# Patient Record
Sex: FEMALE | URBAN - METROPOLITAN AREA
[De-identification: names, ages, dates, MRNs, and addresses within clinical notes are randomized per-mention and may not be internally consistent; named-entity substitution may affect disease eponyms.]

---

## 2018-04-19 ENCOUNTER — APPOINTMENT (RX ONLY)
Dept: URBAN - METROPOLITAN AREA CLINIC 23 | Facility: CLINIC | Age: 63
Setting detail: DERMATOLOGY
End: 2018-04-19

## 2018-04-19 DIAGNOSIS — L43.8 OTHER LICHEN PLANUS: ICD-10-CM

## 2018-04-19 PROBLEM — L44.8 OTHER SPECIFIED PAPULOSQUAMOUS DISORDERS: Status: ACTIVE | Noted: 2018-04-19

## 2018-04-19 PROCEDURE — ? INTRALESIONAL KENALOG

## 2018-04-19 PROCEDURE — 11900 INJECT SKIN LESIONS </W 7: CPT

## 2018-04-19 ASSESSMENT — LOCATION SIMPLE DESCRIPTION DERM: LOCATION SIMPLE: CHEST

## 2018-04-19 ASSESSMENT — LOCATION ZONE DERM: LOCATION ZONE: TRUNK

## 2018-04-19 ASSESSMENT — LOCATION DETAILED DESCRIPTION DERM: LOCATION DETAILED: LEFT MEDIAL SUPERIOR CHEST

## 2018-04-19 NOTE — PROCEDURE: INTRALESIONAL KENALOG
Medical Necessity Clause: This procedure was medically necessary because the lesions that were treated were:
Include Z78.9 (Other Specified Conditions Influencing Health Status) As An Associated Diagnosis?: No
Expiration Date (Optional): 10/19
Lot # (Optional): [id number]
Total Volume Injected (Ccs- Only Use Numbers And Decimals): 0.2
Detail Level: Zone
X Size Of Lesion In Cm (Optional): 0
Consent: The risks of atrophy were reviewed with the patient.
Ndc# For Kenalog Only: 7966-1306-90
Administered By (Optional): Dr Dejuan Michel
Concentration Of Solution Injected (Mg/Ml): 2.0
Treatment Number (Optional): 1
Kenalog Preparation: Kenalog

## 2018-05-11 ENCOUNTER — RX ONLY (OUTPATIENT)
Age: 63
Setting detail: RX ONLY
End: 2018-05-11

## 2018-05-11 RX ORDER — TRETINOIN 0.5 MG/G
CREAM TOPICAL QD
Qty: 1 | Refills: 3 | Status: ERX | COMMUNITY
Start: 2018-05-11

## 2018-09-04 ENCOUNTER — APPOINTMENT (RX ONLY)
Dept: URBAN - METROPOLITAN AREA CLINIC 23 | Facility: CLINIC | Age: 63
Setting detail: DERMATOLOGY
End: 2018-09-04

## 2018-09-04 DIAGNOSIS — L60.8 OTHER NAIL DISORDERS: ICD-10-CM

## 2018-09-04 PROCEDURE — 99212 OFFICE O/P EST SF 10 MIN: CPT

## 2018-09-04 PROCEDURE — ? ADDITIONAL NOTES

## 2018-09-04 PROCEDURE — ? COUNSELING

## 2018-09-04 PROCEDURE — ? RECOMMENDATIONS

## 2018-09-04 ASSESSMENT — LOCATION DETAILED DESCRIPTION DERM
LOCATION DETAILED: LEFT MIDDLE FINGERNAIL
LOCATION DETAILED: RIGHT MIDDLE FINGERNAIL
LOCATION DETAILED: LEFT SMALL FINGERNAIL
LOCATION DETAILED: LEFT INDEX FINGERNAIL
LOCATION DETAILED: RIGHT INDEX FINGERNAIL
LOCATION DETAILED: RIGHT RING FINGERNAIL
LOCATION DETAILED: LEFT RING FINGERNAIL

## 2018-09-04 ASSESSMENT — LOCATION SIMPLE DESCRIPTION DERM
LOCATION SIMPLE: RIGHT MIDDLE FINGERNAIL
LOCATION SIMPLE: LEFT SMALL FINGERNAIL
LOCATION SIMPLE: RIGHT RING FINGERNAIL
LOCATION SIMPLE: LEFT RING FINGERNAIL
LOCATION SIMPLE: LEFT MIDDLE FINGERNAIL
LOCATION SIMPLE: RIGHT INDEX FINGERNAIL
LOCATION SIMPLE: LEFT INDEX FINGERNAIL

## 2018-09-04 ASSESSMENT — LOCATION ZONE DERM: LOCATION ZONE: FINGERNAIL

## 2018-09-04 NOTE — HPI: OTHER
Condition:: Brittle nails
Please Describe Your Condition:: Pt stated condition started a few weeks ago.  Has tried OTC products with no improvement

## 2018-12-13 ENCOUNTER — APPOINTMENT (RX ONLY)
Dept: URBAN - METROPOLITAN AREA CLINIC 23 | Facility: CLINIC | Age: 63
Setting detail: DERMATOLOGY
End: 2018-12-13

## 2018-12-13 DIAGNOSIS — L60.9 NAIL DISORDER, UNSPECIFIED: ICD-10-CM

## 2018-12-13 PROCEDURE — ? COUNSELING

## 2018-12-13 PROCEDURE — ? NAIL CLIPPING FOR PAS

## 2018-12-13 PROCEDURE — 99213 OFFICE O/P EST LOW 20 MIN: CPT

## 2018-12-13 ASSESSMENT — LOCATION SIMPLE DESCRIPTION DERM: LOCATION SIMPLE: RIGHT INDEX FINGERNAIL

## 2018-12-13 ASSESSMENT — LOCATION ZONE DERM: LOCATION ZONE: FINGERNAIL

## 2018-12-13 ASSESSMENT — LOCATION DETAILED DESCRIPTION DERM: LOCATION DETAILED: RIGHT INDEX FINGERNAIL

## 2018-12-13 NOTE — PROCEDURE: COUNSELING
Detail Level: Detailed
Patient Specific Counseling (Will Not Stick From Patient To Patient): Stop applying topical solution recommended \" Brian Phil". Pictures of the nails taken today.

## 2018-12-13 NOTE — HPI: NAIL DYSTROPHY
How Severe Is It?: moderate
Is This A New Presentation, Or A Follow-Up?: Follow Up Nail Dystrophy
Additional History: Pt stop using the chimica due to burning sensation.

## 2018-12-26 ENCOUNTER — RX ONLY (OUTPATIENT)
Age: 63
Setting detail: RX ONLY
End: 2018-12-26

## 2018-12-26 RX ORDER — CLOBETASOL PROPIONATE 0.5 MG/G
CREAM TOPICAL
Qty: 1 | Refills: 0 | Status: ERX | COMMUNITY
Start: 2018-12-26

## 2019-03-25 ENCOUNTER — RX ONLY (OUTPATIENT)
Age: 64
Setting detail: RX ONLY
End: 2019-03-25

## 2019-03-25 RX ORDER — VALACYCLOVIR HYDROCHLORIDE 500 MG/1
TABLET, FILM COATED ORAL BID
Qty: 6 | Refills: 3 | Status: ERX | COMMUNITY
Start: 2019-03-25

## 2020-05-05 ENCOUNTER — RX ONLY (OUTPATIENT)
Age: 65
Setting detail: RX ONLY
End: 2020-05-05

## 2020-05-05 RX ORDER — TRETINOIN 0.5 MG/G
CREAM TOPICAL QD
Qty: 1 | Refills: 3 | Status: ERX | COMMUNITY
Start: 2020-05-05

## 2020-07-10 ENCOUNTER — RX ONLY (OUTPATIENT)
Age: 65
Setting detail: RX ONLY
End: 2020-07-10

## 2020-07-10 RX ORDER — VALACYCLOVIR HYDROCHLORIDE 500 MG/1
TABLET, FILM COATED ORAL BID
Qty: 30 | Refills: 3 | Status: ERX

## 2020-08-10 ENCOUNTER — APPOINTMENT (RX ONLY)
Dept: URBAN - METROPOLITAN AREA CLINIC 23 | Facility: CLINIC | Age: 65
Setting detail: DERMATOLOGY
End: 2020-08-10

## 2020-08-10 VITALS — TEMPERATURE: 97.5 F

## 2020-08-10 DIAGNOSIS — Z41.9 ENCOUNTER FOR PROCEDURE FOR PURPOSES OTHER THAN REMEDYING HEALTH STATE, UNSPECIFIED: ICD-10-CM

## 2020-08-10 PROCEDURE — ? FILLERS

## 2020-08-10 PROCEDURE — ? DYSPORT

## 2020-08-10 NOTE — PROCEDURE: DYSPORT
Forehead Units: 0
Additional Area 4 Location: lateral eyes
Show Additional Area 3: Yes
Show Right And Left Brow Units: No
Additional Area 4 Units: 48
Expiration Date (Month Year): 01/31/21
Additional Area 1 Location: high forehead
Additional Area 5 Location: lateral brows
Price (Use Numbers Only, No Special Characters Or $): 0.00
Additional Area 5 Units: 10
Additional Area 1 Units: [address] [facility]
Detail Level: Simple
Additional Area 2 Location: glabella
Topical Anesthesia?: 20% benzocaine, 8% lidocaine, 4% tetracaine
Consent: Written consent obtained. Risks include but not limited to lid/brow ptosis, bruising, swelling, diplopia, temporary effect, incomplete chemical denervation.
Additional Area 2 Units: [address] [address]ía
Lot #: [id number]
Additional Area 3 Location: bunny lines
Length Of Topical Anesthesia Application (Optional): 15 minutes
Dilution (U/ 0.1cc): 1.5

## 2020-08-10 NOTE — PROCEDURE: FILLERS
Additional Area 1 Volume In Cc: 0
Additional Area 4 Location: Perioral
Additional Area 2 Location: Nasalabial, Glabellar fine lines- Complimentary
Additional Anesthesia Volume In Cc: 6
Include Cannula Information In Note?: No
Additional Area 1 Location: lateral cheeks, lateral mouth, NLFâs, marionette lines
Include Cannula Size?: 25G
Additional Area 1 Volume In Cc: 1
Lot #: [id number]
Additional Area 2 Location: cheeks, jawline, oral commissure
Include Cannula Length?: 1.5 inch
Expiration Date (Month Year): 09/2021
Filler: Voluma
Detail Level: Zone
Additional Area 1 Location: lateral mouth, perioral fine lines, vermillion lips, marionette lines
Include Cannula Brand?: DermaSculpt
Price (Use Numbers Only, No Special Characters Or $): 0.00
Lot #: [id number]
Additional Area 4 Location: lateral jawline, lateral mouth, glabella lines,
Additional Area 2 Location: Lips, oral commissure, glabellar fine fines
Additional Area 5 Location: Cheeks, vermillion lips, marionette fine lines, glabellar fine lines, lateral mouth
Expiration Date (Month Year): 08/31/2019
Map Statment: See [address] for Complete Details
Additional Area 3 Location: Glabbellar fine lines, Nasalabial folds, Marionette lines, vermillion lip
Lot #: [id number]
Expiration Date (Month Year): 2021-08-20
Anesthesia Type: 1% lidocaine without epinephrine
Consent: Written consent obtained. Risks include but not limited to bruising, beading, irregular texture, ulceration, infection, allergic reaction, scar formation, incomplete augmentation, temporary nature, procedural pain.
Post-Care Instructions: Patient instructed to apply ice to reduce swelling.
Additional Area 1 Location: lateral mouth, fine lines perioral, marionette lines, lateral cheeks, vermillion lips

## 2021-05-26 ENCOUNTER — RX ONLY (OUTPATIENT)
Age: 66
Setting detail: RX ONLY
End: 2021-05-26

## 2021-05-26 RX ORDER — TRETINOIN 0.5 MG/G
CREAM TOPICAL QD
Qty: 1 | Refills: 3 | Status: ERX

## 2021-07-08 ENCOUNTER — RX ONLY (OUTPATIENT)
Age: 66
Setting detail: RX ONLY
End: 2021-07-08

## 2021-07-08 RX ORDER — VALACYCLOVIR HYDROCHLORIDE 500 MG/1
TABLET, FILM COATED ORAL BID
Qty: 30 | Refills: 3 | Status: ERX

## 2022-05-20 ENCOUNTER — APPOINTMENT (RX ONLY)
Dept: URBAN - METROPOLITAN AREA CLINIC 23 | Facility: CLINIC | Age: 67
Setting detail: DERMATOLOGY
End: 2022-05-20

## 2022-05-20 DIAGNOSIS — Z41.9 ENCOUNTER FOR PROCEDURE FOR PURPOSES OTHER THAN REMEDYING HEALTH STATE, UNSPECIFIED: ICD-10-CM

## 2022-05-20 PROCEDURE — ? ADDITIONAL NOTES

## 2022-05-20 PROCEDURE — ? PULSED-DYE LASER

## 2022-05-20 PROCEDURE — ? DYSPORT

## 2022-05-20 PROCEDURE — ? RESTYLANE REFYNE INJECTION

## 2022-05-20 ASSESSMENT — LOCATION SIMPLE DESCRIPTION DERM
LOCATION SIMPLE: LEFT CHEEK
LOCATION SIMPLE: LEFT FOREHEAD
LOCATION SIMPLE: RIGHT CHEEK
LOCATION SIMPLE: LEFT UPPER LIP
LOCATION SIMPLE: GLABELLA
LOCATION SIMPLE: RIGHT LIP
LOCATION SIMPLE: RIGHT FOREHEAD
LOCATION SIMPLE: RIGHT EYELID
LOCATION SIMPLE: CHIN

## 2022-05-20 ASSESSMENT — LOCATION ZONE DERM
LOCATION ZONE: LIP
LOCATION ZONE: FACE
LOCATION ZONE: EYELID

## 2022-05-20 ASSESSMENT — LOCATION DETAILED DESCRIPTION DERM
LOCATION DETAILED: RIGHT INFERIOR CENTRAL BUCCAL CHEEK
LOCATION DETAILED: GLABELLA
LOCATION DETAILED: RIGHT LATERAL FOREHEAD
LOCATION DETAILED: LEFT CHIN
LOCATION DETAILED: LEFT SUPERIOR LATERAL MALAR CHEEK
LOCATION DETAILED: RIGHT MEDIAL FOREHEAD
LOCATION DETAILED: RIGHT SUPERIOR MEDIAL BUCCAL CHEEK
LOCATION DETAILED: LEFT FOREHEAD
LOCATION DETAILED: LEFT INFERIOR CENTRAL BUCCAL CHEEK
LOCATION DETAILED: RIGHT SUPERIOR VERMILION LIP
LOCATION DETAILED: RIGHT LATERAL CANTHUS
LOCATION DETAILED: LEFT SUPERIOR VERMILION BORDER
LOCATION DETAILED: LEFT INFERIOR MEDIAL MALAR CHEEK

## 2022-05-20 NOTE — PROCEDURE: ADDITIONAL NOTES
Additional Notes: N/C V-beam for bruising
Render Risk Assessment In Note?: no
Detail Level: Detailed

## 2022-05-20 NOTE — PROCEDURE: RESTYLANE REFYNE INJECTION
Filler: Restylane Refyne
Detail Level: Detailed
Mid Face Filler  Volume In Cc: 0
Anesthesia Volume In Cc: 0.5
Include Cannula Information In Note?: No
Post-Care Instructions: Patient instructed to apply ice to reduce swelling.
Marionette Lines Filler  Volume In Cc: 0.3
Map Statement: See [address] for Complete Details
Show Inventory Tab: Show
Number Of Syringes (Required For Inventory): 1
Procedural Text: The filler was administered to the treatment areas noted above.
Consent: Written consent obtained. Risks include but not limited to bruising, beading, irregular texture, ulceration, infection, allergic reaction, scar formation, incomplete augmentation, temporary nature, procedural pain.
Additional Anesthesia Volume In Cc: 6
Anesthesia Type: 1% lidocaine with epinephrine
Nasolabial Folds Filler Volume In Cc: 0.4

## 2022-05-20 NOTE — PROCEDURE: PULSED-DYE LASER
Pulse Duration: 10 ms
Fluence In J/Cm2 (Optional): 6.5
Treated Area: small area
Spot Size: 7 mm
Price (Use Numbers Only, No Special Characters Or $): 0.00
Cryogen Time (Ms): 765[address] Kristofer García
Laser Type: Vbeam 595nm
Delay Time (Ms): [address] [facility]
Spot Size: 10 mm
Detail Level: Detailed
Cryogen Time (Ms): 30
Delay Time (Ms): 20
Consent: Written consent obtained, risks reviewed including but not limited to crusting, scabbing, blistering, scarring, darker or lighter pigmentary change, incidental hair removal, bruising, and/or incomplete removal.
Location Override: bruising
Post-Procedure Care: Vaseline and ice applied. Post care reviewed with patient.
Fluence In J/Cm2 (Optional): 7.00
Pulse Duration: 6 ms
Post-Care Instructions: I reviewed with the patient in detail post-care instructions. Patient should stay away from the sun and wear sun protection until treated areas are fully healed.

## 2022-05-20 NOTE — PROCEDURE: DYSPORT
Show Right And Left Pupillary Line Units: No
[facility] Units: 0
Show Additional Area 1: Yes
Post-Care Instructions: Patient instructed to not lie down for 4 hours, limit physical activity for 24 hours and avoid manipulation of the treated areas.
Detail Level: Detailed
Glabellar Complex Units: [address] Kristofer García
Additional Area 6 Location: brows
Expiration Date (Month Year): 07/31/20
Additional Area 4 Location: crows feet
Additional Area 6 Units: 10
Show Inventory Tab: Show
Additional Area 4 Units: 48
Lot #: [id number]
Additional Area 5 Location: bunny lines
Forehead Units: [address] [facility]
Dilution (U/ 0.1cc): 1.5
Additional Area 3 Location: forehead
Consent: Written consent obtained. Risks include but not limited to lid/brow ptosis, bruising, swelling, diplopia, temporary effect, incomplete chemical denervation.
Additional Area 1 Location: Glabella

## 2022-08-10 ENCOUNTER — APPOINTMENT (RX ONLY)
Dept: URBAN - METROPOLITAN AREA CLINIC 23 | Facility: CLINIC | Age: 67
Setting detail: DERMATOLOGY
End: 2022-08-10

## 2022-08-10 DIAGNOSIS — Z41.9 ENCOUNTER FOR PROCEDURE FOR PURPOSES OTHER THAN REMEDYING HEALTH STATE, UNSPECIFIED: ICD-10-CM

## 2022-08-10 PROCEDURE — ? DYSPORT

## 2022-08-10 NOTE — PROCEDURE: DYSPORT
Right Pupillary Line Units: 0
Additional Area 4 Location: 2cm high forehead
Show Orbicularis Oculi Units: Yes
Expiration Date (Month Year): 2022-08
Show Inventory Tab: Show
Show Right And Left Pupillary Line Units: No
Additional Area 5 Location: glabella
Price (Use Numbers Only, No Special Characters Or $): 0.00
Additional Area 1 Location: neck bands
Additional Area 2 Location: crows feet
Lot #: [id number]
Consent: Written consent obtained. Risks include but not limited to lid/brow ptosis, bruising, swelling, diplopia, temporary effect, incomplete chemical denervation.
Additional Area 2 Units: 10
Additional Area 3 Location: lateral brows
Dilution (U/ 0.1cc): 1.5
Detail Level: Simple

## 2022-08-12 ENCOUNTER — RX ONLY (OUTPATIENT)
Age: 67
Setting detail: RX ONLY
End: 2022-08-12

## 2022-08-12 RX ORDER — TRETIONIN 0.5 MG/G
CREAM TOPICAL
Qty: 45 | Refills: 0 | Status: ERX | COMMUNITY
Start: 2022-08-12

## 2023-11-06 ENCOUNTER — APPOINTMENT (RX ONLY)
Dept: URBAN - METROPOLITAN AREA CLINIC 23 | Facility: CLINIC | Age: 68
Setting detail: DERMATOLOGY
End: 2023-11-06

## 2023-11-06 DIAGNOSIS — Z41.9 ENCOUNTER FOR PROCEDURE FOR PURPOSES OTHER THAN REMEDYING HEALTH STATE, UNSPECIFIED: ICD-10-CM

## 2023-11-06 PROCEDURE — ? DYSPORT

## 2023-11-06 PROCEDURE — ? INVENTORY

## 2023-11-06 PROCEDURE — ? FILLERS

## 2023-11-06 NOTE — PROCEDURE: DYSPORT
Show Additional Area 3: Yes
Periorbital Skin Units: [address]
Dilution (U/ 0.1cc): 1.5
Show Right And Left Periorbital Units: No
Additional Area 3 Units: 0
Additional Area 4 Location: 2cm high forehead
Expiration Date (Month Year): 2022-08
Additional Area 5 Location: glabella
Forehead Units: [address] [facility]
Additional Area 1 Location: neck bands
Price (Use Numbers Only, No Special Characters Or $): 0.00
Detail Level: Simple
Additional Area 2 Location: crows feet
Show Inventory Tab: Show
Consent: Written consent obtained. Risks include but not limited to lid/brow ptosis, bruising, swelling, diplopia, temporary effect, incomplete chemical denervation.
Lot #: [id number]

## 2024-03-11 ENCOUNTER — APPOINTMENT (RX ONLY)
Dept: URBAN - METROPOLITAN AREA CLINIC 23 | Facility: CLINIC | Age: 69
Setting detail: DERMATOLOGY
End: 2024-03-11

## 2024-03-11 DIAGNOSIS — Z41.9 ENCOUNTER FOR PROCEDURE FOR PURPOSES OTHER THAN REMEDYING HEALTH STATE, UNSPECIFIED: ICD-10-CM

## 2024-03-11 PROCEDURE — ? FILLERS

## 2024-03-11 PROCEDURE — ? DYSPORT

## 2024-03-11 PROCEDURE — ? INVENTORY

## 2024-03-11 NOTE — PROCEDURE: FILLERS
Include Cannula Information In Note?: No
Additional Area 5 Volume In Cc: 0
Additional Anesthesia Volume In Cc: 6
Number Of Syringes (Required For Inventory): 1
Inventory Information: This plan will send filler information to inventory based on the fillers you select. Multiple fillers can be sent but you must ensure you select the appropriate fillers in the inventory tab.
Additional Area 1 Location: lips, nasolabial, glabella
Detail Level: Detailed
Show Inventory Tab: Show
Filler: Restylane
Additional Area 1 Location: lateral mouth, perioral fine lines, marionette lines.
Consent: Written consent obtained. Risks include but not limited to bruising, beading, irregular texture, ulceration, infection, allergic reaction, scar formation, incomplete augmentation, temporary nature, procedural pain.
Map Statment: See Attach Map for Complete Details
Post-Care Instructions: Patient instructed to apply ice to reduce swelling.
Filler Comments: Restylane Eyelight
Anesthesia Type: 1% lidocaine with epinephrine
Anesthesia Volume In Cc: 0.5
Filler: Restylane-L

## 2024-03-11 NOTE — PROCEDURE: DYSPORT
Glabellar Complex Units: 30
Detail Level: Simple
Price (Use Numbers Only, No Special Characters Or $): 0.00
Show Anterior Platysmal Band Units: Yes
R Brow Units: 0
Additional Area 1 Location: neck bands
Show Right And Left Brow Units: No
Show Inventory Tab: Show
Additional Area 2 Location: crows feet
Lot #: [id number]
Consent: Written consent obtained. Risks include but not limited to lid/brow ptosis, bruising, swelling, diplopia, temporary effect, incomplete chemical denervation.
Additional Area 3 Location: glabella
Dilution (U/0.1 Cc): 1.5
Additional Area 4 Location: 2cm high forehead
Expiration Date (Month Year): 2022-08
Forehead Units: 20

## 2024-08-28 NOTE — PROCEDURE: FILLERS
Statement Selected
[place] Filler  Volume In Cc: 0
Include Cannula Information In Note?: No
Number Of Syringes (Required For Inventory): 1
Additional Anesthesia Volume In Cc: 6
Inventory Information: This plan will send filler information to inventory based on the fillers you select. Multiple fillers can be sent but you must ensure you select the appropriate fillers in the inventory tab.
Additional Area 1 Location: lateral mouth, marionette lines,
Detail Level: Detailed
Show Inventory Tab: Show
Consent: Written consent obtained. Risks include but not limited to bruising, beading, irregular texture, ulceration, infection, allergic reaction, scar formation, incomplete augmentation, temporary nature, procedural pain.
Filler: Restylane-L
Post-Care Instructions: Patient instructed to apply ice to reduce swelling.
Additional Area 1 Location: lateral mouth, perioral fine lines, marionette lines.
Map Statment: See [address] for Complete Details
[place] Lips Filler Volume In Cc: 0.5
Anesthesia Type: 1% lidocaine with epinephrine

## 2024-11-13 ENCOUNTER — APPOINTMENT (RX ONLY)
Dept: URBAN - METROPOLITAN AREA CLINIC 23 | Facility: CLINIC | Age: 69
Setting detail: DERMATOLOGY
End: 2024-11-13

## 2024-11-13 DIAGNOSIS — Z41.9 ENCOUNTER FOR PROCEDURE FOR PURPOSES OTHER THAN REMEDYING HEALTH STATE, UNSPECIFIED: ICD-10-CM

## 2024-11-13 PROCEDURE — ? IN-HOUSE DISPENSING PHARMACY

## 2024-11-13 NOTE — PROCEDURE: IN-HOUSE DISPENSING PHARMACY
Product 74 Amount/Unit (Numbers Only): 0
Name Of Product 2: Valtrex
Detail Level: Zone
Product 13 Application Directions: Apply to the entire face in the Am and pm
Product 11 Refills: 1
Product 50 Unit Type: mg
Product 22 Application Directions: Apply to the affected area of the face
Product 13 Unit Type: bottle(s)
Send Charges To Patient Encounter: No
Product 4 Application Directions: Take one tablet half hour today prior to procedure as indicated. Dispense in office
Product 2 Application Directions: apply to affected areas left lower leg am and pm  as indicated
Name Of Product 14: Skin Better Interfuse eye treatment cream
Product 4 Unit Type: tablets
Name Of Product 5: loratadine 10 mg
Name Of Product 8: TNS Essential
Name Of Product 3: Tretinoin 0.05% cream
Product 14 Application Directions: Apply to the under eyes in the Am and pm
Name Of Product 19: Diazepam 5mg
Name Of Product 1: Hydroquinone 4% / Tretinoin 0.05% / Fluocinolone 0.01%
Product 7 Unit Type: ml
Product 12 Application Directions: Apply a pea size amount to the entire face at bedtime.
Product 10 Application Directions: Take 1 tablet 2 times daily for 3 days
Product 21 Application Directions: Take one tablet 30 minutes prior to procedure
Product 8 Application Directions: Apply to the clean face in the AM and PM daily
Product 3 Amount/Unit (Numbers Only): 20
Product 1 Application Directions: Apply thin layer to right cheek every night  at bedtime only.
Name Of Product 13: Skin Better Even tone correcting serum
Product 1 Unit Type: tube(s)
Name Of Product 22: Brightening Pads
Name Of Product 9: Clearing tone pads
Product 2 Price/Unit (In Dollars): 0.00
Product 6 Application Directions: Take one tablet for swelling today at the office as indicated and one tablet tomorrow
Product 11 Application Directions: Apply a thin layer to the acne prone areas in the AM
Product 9 Application Directions: Apply to the acne prone areas in the Am and Pm
Name Of Product 7: Latisse 5ml
Product 2 Amount/Unit (Numbers Only): 6
Product 9 Unit Type: jar(s)
Name Of Product 12: Retinol Lite
Name Of Product 10: Valtrex 500 mg
Name Of Product 21: Valium 5mg
Product 7 Application Directions: Apply to lashes at bedtime daily as indicated
Product 3 Units Dispensed: 2
Product 7 Amount/Unit (Numbers Only): 5
Product 5 Application Directions: Take one tablet today after procedure with full glass of water as indicated
Product 3 Application Directions: Apply a pea size amount to the entire face at night
Product 3 Unit Type: grams
Name Of Product 6: Prednisone 20mg
Name Of Product 4: Valium 5 mg
Name Of Product 11: Clearing gel

## 2024-11-18 ENCOUNTER — APPOINTMENT (RX ONLY)
Dept: URBAN - METROPOLITAN AREA CLINIC 23 | Facility: CLINIC | Age: 69
Setting detail: DERMATOLOGY
End: 2024-11-18

## 2024-11-18 DIAGNOSIS — Z41.9 ENCOUNTER FOR PROCEDURE FOR PURPOSES OTHER THAN REMEDYING HEALTH STATE, UNSPECIFIED: ICD-10-CM

## 2024-11-18 PROCEDURE — ? DYSPORT

## 2024-11-18 PROCEDURE — ? IN-HOUSE DISPENSING PHARMACY

## 2024-11-18 PROCEDURE — ? FILLERS

## 2024-11-18 NOTE — PROCEDURE: FILLERS
Additional Area 2 Volume In Cc: 0
Include Cannula Information In Note?: No
Consent: Written consent obtained. Risks include but not limited to bruising, beading, irregular texture, ulceration, infection, allergic reaction, scar formation, incomplete augmentation, temporary nature, procedural pain.
Anesthesia Volume In Cc: 0.5
Number Of Syringes (Required For Inventory): 1
Post-Care Instructions: Patient instructed to apply ice to reduce swelling.
Additional Anesthesia Volume In Cc: 6
Map Statment: See Attach Map for Complete Details
Additional Area 2 Location: lateral jawline, lip lines, oral commissures
Filler: Restylane-L
Inventory Information: This plan will send filler information to inventory based on the fillers you select. Multiple fillers can be sent but you must ensure you select the appropriate fillers in the inventory tab.
Detail Level: Detailed
Additional Area 1 Location: lateral mouth, perioral fine lines, marionette lines.
Show Inventory Tab: Show
Anesthesia Type: 1% lidocaine with epinephrine

## 2024-11-18 NOTE — PROCEDURE: IN-HOUSE DISPENSING PHARMACY
Product 24 Price/Unit (In Dollars): 0
Name Of Product 1: Hydroquinone 4% / Tretinoin 0.05% / Fluocinolone 0.01%
Product 48 Unit Type: mg
Product 5 Application Directions: Take one tablet today after procedure with full glass of water as indicated
Product 10 Amount/Unit (Numbers Only): 1
Name Of Product 13: Skin Better Even tone correcting serum
Product 8 Application Directions: Apply to the clean face in the AM and PM daily
Name Of Product 22: Brightening Pads
Product 5 Unit Type: tablets
Product 3 Amount/Unit (Numbers Only): 20
Product 8 Unit Type: bottle(s)
Name Of Product 6: Prednisone 20mg
Product 1 Application Directions: Apply thin layer to right cheek every night  at bedtime only.
Product 13 Application Directions: Apply to the entire face in the Am and pm
Product 22 Application Directions: Apply to the affected area of the face
Name Of Product 9: Clearing tone pads
Product 1 Unit Type: tube(s)
Product 6 Application Directions: Take one tablet for swelling today at the office as indicated and one tablet tomorrow
Name Of Product 2: Valtrex
Product 2 Price/Unit (In Dollars): 0.00
Detail Level: Zone
Product 4 Application Directions: Take one tablet half hour today prior to procedure as indicated. Dispense in office
Name Of Product 12: Retinol Lite
Name Of Product 21: Valium 5mg
Send Charges To Patient Encounter: No
Product 2 Amount/Unit (Numbers Only): 6
Name Of Product 5: loratadine 10 mg
Product 21 Application Directions: Take one tablet 30 minutes prior to procedure
Product 12 Application Directions: Apply a pea size amount to the entire face at bedtime.
Product 7 Amount/Unit (Numbers Only): 5
Product 3 Units Dispensed: 4
Product 7 Unit Type: ml
Name Of Product 8: TNS Essential
Product 3 Application Directions: Apply a pea size amount to the entire face at night
Name Of Product 11: Clearing gel
Product 3 Unit Type: grams
Name Of Product 4: Valium 5 mg
Product 11 Application Directions: Apply a thin layer to the acne prone areas in the AM
Name Of Product 14: Skin Better Interfuse eye treatment cream
Product 9 Application Directions: Apply to the acne prone areas in the Am and Pm
Product 9 Unit Type: jar(s)
Name Of Product 7: Latisse 5ml
Product 2 Application Directions: apply to affected areas left lower leg am and pm  as indicated
Product 14 Application Directions: Apply to the under eyes in the Am and pm
Name Of Product 10: Valtrex 500 mg
Name Of Product 19: Diazepam 5mg
Product 7 Application Directions: Apply to lashes at bedtime daily as indicated
Name Of Product 3: Tretinoin 0.05% cream
Product 10 Application Directions: Take 1 tablet 2 times daily for 3 days

## 2024-11-18 NOTE — PROCEDURE: DYSPORT
Glabellar Complex Units: 30
Detail Level: Simple
Price (Use Numbers Only, No Special Characters Or $): 0.00
Show Anterior Platysmal Band Units: Yes
R Brow Units: 0
Additional Area 1 Location: neck bands
Show Right And Left Brow Units: No
Show Inventory Tab: Show
Additional Area 2 Location: crows feet
Lot #: [id number]
Consent: Written consent obtained. Risks include but not limited to lid/brow ptosis, bruising, swelling, diplopia, temporary effect, incomplete chemical denervation.
Additional Area 3 Location: glabella
Dilution (U/0.1 Cc): 1.5
Additional Area 4 Location: 2cm high forehead
Expiration Date (Month Year): 2022-08
Forehead Units: 20

## 2025-02-17 ENCOUNTER — APPOINTMENT (OUTPATIENT)
Dept: URBAN - METROPOLITAN AREA CLINIC 23 | Facility: CLINIC | Age: 70
Setting detail: DERMATOLOGY
End: 2025-02-17

## 2025-02-17 DIAGNOSIS — Z41.9 ENCOUNTER FOR PROCEDURE FOR PURPOSES OTHER THAN REMEDYING HEALTH STATE, UNSPECIFIED: ICD-10-CM

## 2025-02-17 PROCEDURE — ? DYSPORT

## 2025-02-17 PROCEDURE — ? FILLERS

## 2025-02-17 NOTE — PROCEDURE: FILLERS
Additional Area 5 Volume In Cc: 0
Anesthesia Type: 1% lidocaine with epinephrine
Additional Area 1 Location: lateral mouth, perioral fine lines, marionette lines.
Number Of Syringes (Required For Inventory): 1
Include Cannula Information In Note?: No
Anesthesia Volume In Cc: 0.5
Additional Anesthesia Volume In Cc: 6
Inventory Information: This plan will send filler information to inventory based on the fillers you select. Multiple fillers can be sent but you must ensure you select the appropriate fillers in the inventory tab.
Additional Area 1 Location: jawline,oral commesure
Detail Level: Detailed
Show Inventory Tab: Show
Filler: Restylane-L
Consent: Written consent obtained. Risks include but not limited to bruising, beading, irregular texture, ulceration, infection, allergic reaction, scar formation, incomplete augmentation, temporary nature, procedural pain.
Post-Care Instructions: Patient instructed to apply ice to reduce swelling.
Map Statment: See Attach Map for Complete Details

## 2025-02-17 NOTE — PROCEDURE: DYSPORT
Expiration Date (Month Year): 2022-08
Show Additional Area 3: Yes
Consent: Written consent obtained. Risks include but not limited to lid/brow ptosis, bruising, swelling, diplopia, temporary effect, incomplete chemical denervation.
Additional Area 4 Units: 0
Additional Area 5 Location: glabella
Show Ucl Units: No
Forehead Units: 20
Additional Area 1 Location: lat brows
Detail Level: Simple
Glabellar Complex Units: 50
Additional Area 1 Units: 10
Additional Area 2 Location: crows feet
Show Inventory Tab: Show
Lot #: [id number]
Price (Use Numbers Only, No Special Characters Or $): 0.00
Periorbital Skin Units: 40
Dilution (U/0.1 Cc): 1.5
Additional Area 4 Location: 2cm high forehead

## 2025-06-02 ENCOUNTER — APPOINTMENT (OUTPATIENT)
Dept: URBAN - METROPOLITAN AREA CLINIC 23 | Facility: CLINIC | Age: 70
Setting detail: DERMATOLOGY
End: 2025-06-02

## 2025-06-02 DIAGNOSIS — Z41.9 ENCOUNTER FOR PROCEDURE FOR PURPOSES OTHER THAN REMEDYING HEALTH STATE, UNSPECIFIED: ICD-10-CM

## 2025-06-02 PROCEDURE — ? IN-HOUSE DISPENSING PHARMACY

## 2025-06-02 PROCEDURE — ? DYSPORT

## 2025-06-02 PROCEDURE — ? FILLERS

## 2025-06-02 NOTE — PROCEDURE: DYSPORT
Show Additional Area 3: Yes
[facility] Units: 0
Show Ucl Units: No
Lot #: [id number]
Consent: Written consent obtained. Risks include but not limited to lid/brow ptosis, bruising, swelling, diplopia, temporary effect, incomplete chemical denervation.
Forehead Units: 20
Additional Area 1 Location: lat brows
Glabellar Complex Units: 50
Detail Level: Simple
Dilution (U/0.1 Cc): 1.5
Additional Area 1 Units: 10
Additional Area 2 Location: crows feet
Show Inventory Tab: Show
Expiration Date (Month Year): 2022-08
Additional Area 5 Location: glabella
Periorbital Skin Units: 40
Price (Use Numbers Only, No Special Characters Or $): 0.00
Additional Area 4 Location: 2cm high forehead

## 2025-06-02 NOTE — PROCEDURE: FILLERS
Cheeks Filler Volume In Cc: 0
Use Map Statement For Sites (Optional): No
Map Statment: See Attach Map for Complete Details
Number Of Syringes (Required For Inventory): 1
Consent: Written consent obtained. Risks include but not limited to bruising, beading, irregular texture, ulceration, infection, allergic reaction, scar formation, incomplete augmentation, temporary nature, procedural pain.
Post-Care Instructions: Patient instructed to apply ice to reduce swelling.
Anesthesia Type: 1% lidocaine with epinephrine
Anesthesia Volume In Cc: 0.5
Additional Anesthesia Volume In Cc: 6
Inventory Information: This plan will send filler information to inventory based on the fillers you select. Multiple fillers can be sent but you must ensure you select the appropriate fillers in the inventory tab.
Detail Level: Detailed
Additional Area 1 Location: jawline, oral commissures
Show Inventory Tab: Show
Filler: Restylane-L
Additional Area 1 Location: lateral mouth, perioral fine lines, marionette lines.

## 2025-06-02 NOTE — PROCEDURE: IN-HOUSE DISPENSING PHARMACY
Product 68 Amount/Unit (Numbers Only): 0
Product 1 Application Directions: Apply thin layer to right cheek every night  at bedtime only.
Product 12 Unit Type: bottle(s)
Product 10 Amount/Unit (Numbers Only): 1
Product 31 Unit Type: mg
Product 21 Application Directions: Take one tablet 30 minutes prior to procedure
Name Of Product 6: Prednisone 20mg
Product 1 Unit Type: tube(s)
Name Of Product 9: Clearing tone pads
Name Of Product 13: Skin Better Even tone correcting serum
Product 21 Unit Type: tablets
Name Of Product 2: Valtrex
Name Of Product 22: Brightening Pads
Product 6 Application Directions: Take one tablet for swelling today at the office as indicated and one tablet tomorrow
Product 13 Application Directions: Apply to the entire face in the Am and pm
Product 2 Application Directions: apply to affected areas left lower leg am and pm  as indicated
Product 2 Amount/Unit (Numbers Only): 6
Render Product Pricing In Note: No
Name Of Product 5: loratadine 10 mg
Name Of Product 12: Retinol Lite
Name Of Product 1: Hydroquinone 4% / Tretinoin 0.05% / Fluocinolone 0.01%
Name Of Product 21: Valium 5mg
Product 5 Application Directions: Take one tablet today after procedure with full glass of water as indicated
Product 7 Amount/Unit (Numbers Only): 5
Product 12 Application Directions: Apply a pea size amount to the entire face at bedtime.
Product 3 Amount/Unit (Numbers Only): 20
Product 8 Application Directions: Apply to the clean face in the AM and PM daily
Product 3 Unit Type: grams
Name Of Product 4: Valium 5 mg
Name Of Product 11: Clearing gel
Product 2 Price/Unit (In Dollars): 0.00
Detail Level: Zone
Product 4 Application Directions: Take one tablet half hour today prior to procedure as indicated. Dispense in office
Product 11 Application Directions: Apply a thin layer to the acne prone areas in the AM
Product 22 Application Directions: Apply to the affected area of the face
Product 9 Application Directions: Apply to the acne prone areas in the Am and Pm
Name Of Product 7: Latisse 5ml
Name Of Product 14: Skin Better Interfuse eye treatment cream
Name Of Product 3: Tretinoin 0.05% cream
Product 9 Unit Type: jar(s)
Name Of Product 10: Valtrex 500 mg
Product 7 Application Directions: Apply to lashes at bedtime daily as indicated
Product 3 Units Dispensed: 4
Product 7 Unit Type: ml
Product 14 Application Directions: Apply to the under eyes in the Am and pm
Name Of Product 19: Diazepam 5mg
Product 3 Application Directions: Apply a pea size amount to the entire face at night
Name Of Product 8: TNS Essential
Product 10 Application Directions: Take 1 tablet 2 times daily for 3 days

## 2025-07-29 ENCOUNTER — APPOINTMENT (OUTPATIENT)
Dept: URBAN - METROPOLITAN AREA CLINIC 23 | Facility: CLINIC | Age: 70
Setting detail: DERMATOLOGY
End: 2025-07-29

## 2025-07-29 DIAGNOSIS — L91.8 OTHER HYPERTROPHIC DISORDERS OF THE SKIN: ICD-10-CM

## 2025-07-29 DIAGNOSIS — L70.8 OTHER ACNE: ICD-10-CM

## 2025-07-29 DIAGNOSIS — Z41.9 ENCOUNTER FOR PROCEDURE FOR PURPOSES OTHER THAN REMEDYING HEALTH STATE, UNSPECIFIED: ICD-10-CM

## 2025-07-29 DIAGNOSIS — L85.3 XEROSIS CUTIS: ICD-10-CM

## 2025-07-29 DIAGNOSIS — L81.4 OTHER MELANIN HYPERPIGMENTATION: ICD-10-CM

## 2025-07-29 PROCEDURE — ? DYSPORT

## 2025-07-29 PROCEDURE — ? FILLERS

## 2025-07-29 PROCEDURE — ? IN-HOUSE DISPENSING PHARMACY

## 2025-07-29 PROCEDURE — ? SKIN TAG REMOVAL

## 2025-07-29 PROCEDURE — ? ACNE SURGERY

## 2025-07-29 PROCEDURE — ?: Mod: 25

## 2025-07-29 PROCEDURE — ?

## 2025-07-29 PROCEDURE — ? COUNSELING

## 2025-07-29 ASSESSMENT — LOCATION DETAILED DESCRIPTION DERM
LOCATION DETAILED: LEFT MEDIAL BREAST 6-7:00 REGION
LOCATION DETAILED: RIGHT MEDIAL BREAST 5-6:00 REGION
LOCATION DETAILED: RIGHT SUPERIOR UPPER BACK

## 2025-07-29 ASSESSMENT — LOCATION ZONE DERM: LOCATION ZONE: TRUNK

## 2025-07-29 ASSESSMENT — LOCATION SIMPLE DESCRIPTION DERM
LOCATION SIMPLE: RIGHT BREAST
LOCATION SIMPLE: RIGHT UPPER BACK
LOCATION SIMPLE: LEFT BREAST

## 2025-07-29 NOTE — HPI: EVALUATION OF SKIN LESION(S)
Hpi Title: Evaluation of Skin Lesions
Additional History: Pt would like back checked for black heads and she would like lesions under breasts injected with kenalog.

## 2025-07-29 NOTE — PROCEDURE: SKIN TAG REMOVAL
Hemostasis: Aluminum Chloride
Include Z78.9 (Other Specified Conditions Influencing Health Status) As An Associated Diagnosis?: No
Medical Necessity Information: It is in your best interest to select a reason for this procedure from the list below. All of these items fulfill various CMS LCD requirements except the new and changing color options.
Medical Necessity Clause: This procedure was medically necessary because the lesions that were treated were:
Detail Level: Detailed
Anesthesia Volume In Cc: 0.3
Add Associated Diagnoses If Applicable When Selecting Medical Necessity: Yes
Consent: Written consent obtained and the risks of skin tag removal was reviewed with the patient including but not limited to bleeding, pigmentary change, infection, pain, and remote possibility of scarring.

## 2025-07-29 NOTE — PROCEDURE: ACNE SURGERY
Acne Type: Comedonal Lesions
Prep Text (Optional): Prior to removal the treatment areas were prepped in the usual fashion.
Extraction Method: lancet and extractor
Post-Care Instructions: I reviewed with the patient in detail post-care instructions. Patient is to keep the treatment areas dry overnight, and then apply bacitracin twice daily until healed. Patient may apply hydrogen peroxide soaks to remove any crusting.
Consent was obtained and risks were reviewed including but not limited to scarring, infection, bleeding, scabbing, incomplete removal, and allergy to anesthesia.
Render Post-Care Instructions In Note?: no
Detail Level: Detailed